# Patient Record
Sex: FEMALE | Race: WHITE | URBAN - METROPOLITAN AREA
[De-identification: names, ages, dates, MRNs, and addresses within clinical notes are randomized per-mention and may not be internally consistent; named-entity substitution may affect disease eponyms.]

---

## 2017-06-19 ENCOUNTER — TELEPHONE (OUTPATIENT)
Dept: NEUROLOGY | Facility: CLINIC | Age: 37
End: 2017-06-19

## 2017-06-19 RX ORDER — PHENOBARBITAL 97.2 MG/1
97.2 TABLET ORAL NIGHTLY
Qty: 90 TABLET | Refills: 1 | Status: SHIPPED | OUTPATIENT
Start: 2017-06-19 | End: 2017-09-17

## 2017-06-19 NOTE — TELEPHONE ENCOUNTER
Refill request for phenobarbital 97.2 mg, take 1 tab nightly, #90, 1 refill    LOV: 10/6/16  NOV: none

## 2017-07-17 ENCOUNTER — TELEPHONE (OUTPATIENT)
Dept: NEUROLOGY | Facility: CLINIC | Age: 37
End: 2017-07-17

## 2017-07-17 DIAGNOSIS — G40.909 SEIZURE DISORDER (HCC): Primary | ICD-10-CM

## 2017-07-17 NOTE — TELEPHONE ENCOUNTER
Spoke to Jerod Neal, given fax # to send outpatient lab orders to. Orders faxed for phenobarb and keppra.

## 2017-07-17 NOTE — TELEPHONE ENCOUNTER
Father called. Called back twice, and only voice mail. Suggested keppra and phenobarb levels be obtained.

## 2017-07-17 NOTE — TELEPHONE ENCOUNTER
Spoke to father. States patient had 4 episodes of severe vertigo yesterday requiring her to lie down. Father spoke to Dr Pia Apley yesterday who advised extra dose of keppra last evening. No further vertigo or seizures today.    Father upset I left message on

## 2017-07-24 ENCOUNTER — TELEPHONE (OUTPATIENT)
Dept: NEUROLOGY | Facility: CLINIC | Age: 37
End: 2017-07-24

## 2017-07-24 RX ORDER — LEVETIRACETAM 100 MG/ML
SOLUTION ORAL
Qty: 1800 ML | Refills: 0 | Status: SHIPPED | OUTPATIENT
Start: 2017-07-24 | End: 2017-10-24

## 2017-07-31 ENCOUNTER — TELEPHONE (OUTPATIENT)
Dept: NEUROLOGY | Facility: CLINIC | Age: 37
End: 2017-07-31

## 2017-08-01 NOTE — TELEPHONE ENCOUNTER
Spoke to patients father Jose Escobar and gave him the results of the Phenobarbital level (17) and Keppra level (50) done 7/19/17 as documented by Dr. Karson Kraft on 7/24/17 telephone encounter.  Mr Munir Escoto is frustrated that he was not notified of the results in a m

## 2017-10-12 RX ORDER — LEVETIRACETAM 100 MG/ML
SOLUTION ORAL
Qty: 1800 ML | Refills: 0 | OUTPATIENT
Start: 2017-10-12

## 2017-10-23 ENCOUNTER — TELEPHONE (OUTPATIENT)
Dept: NEUROLOGY | Facility: CLINIC | Age: 37
End: 2017-10-23

## 2017-10-23 RX ORDER — LEVETIRACETAM 100 MG/ML
SOLUTION ORAL
Qty: 1800 ML | Refills: 0 | Status: CANCELLED | OUTPATIENT
Start: 2017-10-23

## 2017-10-24 ENCOUNTER — OFFICE VISIT (OUTPATIENT)
Dept: NEUROLOGY | Facility: CLINIC | Age: 37
End: 2017-10-24

## 2017-10-24 VITALS
WEIGHT: 139 LBS | HEART RATE: 65 BPM | DIASTOLIC BLOOD PRESSURE: 68 MMHG | HEIGHT: 63 IN | SYSTOLIC BLOOD PRESSURE: 110 MMHG | RESPIRATION RATE: 16 BRPM | BODY MASS INDEX: 24.63 KG/M2

## 2017-10-24 DIAGNOSIS — G40.909 SEIZURE DISORDER (HCC): Primary | ICD-10-CM

## 2017-10-24 PROCEDURE — 99213 OFFICE O/P EST LOW 20 MIN: CPT | Performed by: OTHER

## 2017-10-24 RX ORDER — PHENOBARBITAL 97.2 MG/1
97.2 TABLET ORAL NIGHTLY
Qty: 30 TABLET | Refills: 5 | Status: SHIPPED | OUTPATIENT
Start: 2017-10-24 | End: 2018-05-21

## 2017-10-24 RX ORDER — LEVETIRACETAM 100 MG/ML
1000 SOLUTION ORAL 2 TIMES DAILY
Qty: 1800 ML | Refills: 12 | Status: SHIPPED | OUTPATIENT
Start: 2017-10-24 | End: 2017-11-23

## 2017-10-24 NOTE — PROGRESS NOTES
Heron Woodward : 1980     HPI:   Patient presents with: Follow - Up: lov 10/16 pt states that she had a dizzy spell while sitting on the floor.  pt states that this took place a couple of weeks a go. pt states  that during that episode she did fa Spouse name:                       Years of education:                 Number of children:               Social History Main Topics    Smoking status: Never Smoker                                                                Smokeless tobacco reflex. VII- face symmetric. VIII- Auditory acuity symmetric. IX,X- Palate elevates in midline. XI- Shoulder shrug symmetric. XII- Tongue in midline, without atrophy. Motor: 5/5 strength in the upper and lower extremities.   Tone normal. No pronator d

## 2018-01-22 ENCOUNTER — TELEPHONE (OUTPATIENT)
Dept: NEUROLOGY | Facility: CLINIC | Age: 38
End: 2018-01-22

## 2018-05-21 RX ORDER — PHENOBARBITAL 97.2 MG/1
97.2 TABLET ORAL NIGHTLY
Qty: 30 TABLET | Refills: 5 | Status: SHIPPED | OUTPATIENT
Start: 2018-05-21 | End: 2018-06-20

## 2018-05-21 NOTE — TELEPHONE ENCOUNTER
Refill request for phenobarbital 97.2 mg, take 1 tab nightly, #30, 5 refills    LOV: 10/24/17  NOV: None

## 2018-11-26 RX ORDER — LEVETIRACETAM 100 MG/ML
SOLUTION ORAL
Qty: 1800 ML | Refills: 0 | OUTPATIENT
Start: 2018-11-26

## 2018-11-26 NOTE — TELEPHONE ENCOUNTER
Medication request: levetiracetam 100mg/ml. Take 10ML BID. #1800. No refills    LOV-10/24/2017  NOV-none    [place]:11/23/2017    Called and spoke with patients mother Thai Crocker. Patient has found a new neurologist and will no longer be [facility] patient.

## (undated) NOTE — LETTER
[id number] [facility], [place]  [address], [address] [place] [address] [phone]        Dear Roseann Escobedo, DO,      I had the pleasure of seeing your patient, Marianela Vann on 10/24/2017.      Below please find a 2 (two) times daily. Disp: 1800 mL Rfl: 12   NECON 1/35, 28, 1-35 MG-MCG Oral Tab Take 1 tablet by mouth daily. Disp:  Rfl: 3     No current facility-administered medications for this visit.     Past Medical History:   Diagnosis Date   • Seizure disorder (H Higher Integrative Functions:  Alert and cooperative, with normal attention span and concentration. She has a mild dysarthria, but her language function is intact. Cranial Nerves: II-Visual acuity grossly normal, with full visual fields.   She is able to